# Patient Record
Sex: MALE | Race: WHITE | URBAN - METROPOLITAN AREA
[De-identification: names, ages, dates, MRNs, and addresses within clinical notes are randomized per-mention and may not be internally consistent; named-entity substitution may affect disease eponyms.]

---

## 2017-01-03 ENCOUNTER — IMPORTED ENCOUNTER (OUTPATIENT)
Dept: URBAN - METROPOLITAN AREA CLINIC 50 | Facility: CLINIC | Age: 62
End: 2017-01-03

## 2017-01-03 NOTE — PATIENT DISCUSSION
"""Follow ERM w/o surgery. Call if vision decreases or distortion increases. Recommend regular Amsler checks.  """

## 2018-01-02 ENCOUNTER — IMPORTED ENCOUNTER (OUTPATIENT)
Dept: URBAN - METROPOLITAN AREA CLINIC 50 | Facility: CLINIC | Age: 63
End: 2018-01-02

## 2018-01-10 ENCOUNTER — IMPORTED ENCOUNTER (OUTPATIENT)
Dept: URBAN - METROPOLITAN AREA CLINIC 50 | Facility: CLINIC | Age: 63
End: 2018-01-10

## 2019-03-26 ENCOUNTER — IMPORTED ENCOUNTER (OUTPATIENT)
Dept: URBAN - METROPOLITAN AREA CLINIC 50 | Facility: CLINIC | Age: 64
End: 2019-03-26

## 2019-09-25 ENCOUNTER — IMPORTED ENCOUNTER (OUTPATIENT)
Dept: URBAN - METROPOLITAN AREA CLINIC 50 | Facility: CLINIC | Age: 64
End: 2019-09-25

## 2019-10-08 ENCOUNTER — IMPORTED ENCOUNTER (OUTPATIENT)
Dept: URBAN - METROPOLITAN AREA CLINIC 50 | Facility: CLINIC | Age: 64
End: 2019-10-08

## 2020-05-29 ENCOUNTER — IMPORTED ENCOUNTER (OUTPATIENT)
Dept: URBAN - METROPOLITAN AREA CLINIC 50 | Facility: CLINIC | Age: 65
End: 2020-05-29

## 2020-08-25 ENCOUNTER — IMPORTED ENCOUNTER (OUTPATIENT)
Dept: URBAN - METROPOLITAN AREA CLINIC 50 | Facility: CLINIC | Age: 65
End: 2020-08-25

## 2020-09-10 ENCOUNTER — IMPORTED ENCOUNTER (OUTPATIENT)
Dept: URBAN - METROPOLITAN AREA CLINIC 50 | Facility: CLINIC | Age: 65
End: 2020-09-10

## 2021-04-17 ASSESSMENT — VISUAL ACUITY
OS_CC: 20/20-1
OD_CC: J1+
OD_CC: 20/20
OD_OTHER: 20/20-2. 20/25.
OD_CC: J1+@ 15 IN
OD_CC: 20/20
OS_CC: J1+@ 15 IN
OS_CC: 20/20
OD_OTHER: 20/25.
OS_CC: 20/20
OD_CC: J1+
OD_CC: 20/20
OD_CC: 20/20
OS_BAT: 20/20
OS_CC: 20/20-2
OD_BAT: 20/20-2
OS_CC: 20/20
OD_CC: 20/20
OS_CC: J1+
OD_CC: 20/20
OS_OTHER: 20/25.
OS_CC: 20/20-1
OS_OTHER: 20/20. 20/25.
OS_CC: J1+

## 2021-04-17 ASSESSMENT — TONOMETRY
OD_IOP_MMHG: 18
OS_IOP_MMHG: 16
OS_IOP_MMHG: 16
OD_IOP_MMHG: 15
OS_IOP_MMHG: 15
OD_IOP_MMHG: 16
OS_IOP_MMHG: 17
OS_IOP_MMHG: 15
OS_IOP_MMHG: 18
OD_IOP_MMHG: 13
OD_IOP_MMHG: 15
OD_IOP_MMHG: 14
OD_IOP_MMHG: 14
OS_IOP_MMHG: 17

## 2021-04-17 ASSESSMENT — PACHYMETRY
OS_CT_UM: 567
OS_CT_UM: 567
OD_CT_UM: 565
OS_CT_UM: 567
OS_CT_UM: 567
OD_CT_UM: 565

## 2021-08-27 ENCOUNTER — PREPPED CHART (OUTPATIENT)
Dept: URBAN - METROPOLITAN AREA CLINIC 52 | Facility: CLINIC | Age: 66
End: 2021-08-27

## 2021-11-03 ENCOUNTER — ANNUAL COMPREHENSIVE EXAM (OUTPATIENT)
Dept: URBAN - METROPOLITAN AREA CLINIC 53 | Facility: CLINIC | Age: 66
End: 2021-11-03

## 2021-11-03 DIAGNOSIS — H43.812: ICD-10-CM

## 2021-11-03 DIAGNOSIS — H35.362: ICD-10-CM

## 2021-11-03 DIAGNOSIS — Z79.899: ICD-10-CM

## 2021-11-03 DIAGNOSIS — H25.813: ICD-10-CM

## 2021-11-03 DIAGNOSIS — H35.372: ICD-10-CM

## 2021-11-03 DIAGNOSIS — E11.9: ICD-10-CM

## 2021-11-03 PROCEDURE — 92134 CPTRZ OPH DX IMG PST SGM RTA: CPT

## 2021-11-03 PROCEDURE — 92014 COMPRE OPH EXAM EST PT 1/>: CPT

## 2021-11-03 ASSESSMENT — VISUAL ACUITY
OS_GLARE: >20/400
OU_CC: J1+ @16"
OU_CC: 20/20
OS_GLARE: 20/400
OD_CC: 20/20
OS_CC: 20/25
OD_GLARE: >20/400

## 2021-11-03 ASSESSMENT — TONOMETRY
OD_IOP_MMHG: 15
OS_IOP_MMHG: 14
OS_IOP_MMHG: 15
OD_IOP_MMHG: 16

## 2021-11-03 NOTE — PATIENT DISCUSSION
No toxicity noted on exam today. Ishihara 2/11 OD; 4/11 OS. Patient has a long history of color insufficiency. HVF 10-2 with 5 line OCT ordered next available as well as in 1 year prior to patient's return.

## 2021-11-03 NOTE — PATIENT DISCUSSION
Longstanding stability in IOP without treatment. Will continue to monitor annually, no testing needed at this time.